# Patient Record
Sex: FEMALE
[De-identification: names, ages, dates, MRNs, and addresses within clinical notes are randomized per-mention and may not be internally consistent; named-entity substitution may affect disease eponyms.]

---

## 2022-08-02 ENCOUNTER — NURSE TRIAGE (OUTPATIENT)
Dept: OTHER | Facility: CLINIC | Age: 27
End: 2022-08-02

## 2022-08-02 NOTE — TELEPHONE ENCOUNTER
Subjective: Caller states \"I started birth control 2 weeks ago. I feel like I am having pretty bad anxiety since I started taking it. I have anxiety anyway but I feel like it is worse now. Will this improve? \"     Current Symptoms: anxiety, trouble sleeping, emotional     Onset: 2 weeks ago; worsening    Writer explained that it could take her body some time to regulate the new hormones she is taking and suggest that she reach out to her OBGYN.